# Patient Record
Sex: MALE | Race: WHITE | NOT HISPANIC OR LATINO | Employment: PART TIME | URBAN - METROPOLITAN AREA
[De-identification: names, ages, dates, MRNs, and addresses within clinical notes are randomized per-mention and may not be internally consistent; named-entity substitution may affect disease eponyms.]

---

## 2017-10-16 ENCOUNTER — HOSPITAL ENCOUNTER (EMERGENCY)
Facility: HOSPITAL | Age: 57
Discharge: HOME | End: 2017-10-16
Attending: FAMILY MEDICINE | Admitting: FAMILY MEDICINE

## 2017-10-16 ENCOUNTER — APPOINTMENT (OUTPATIENT)
Dept: GENERAL RADIOLOGY | Facility: HOSPITAL | Age: 57
End: 2017-10-16

## 2017-10-16 VITALS
HEIGHT: 72 IN | BODY MASS INDEX: 28.17 KG/M2 | OXYGEN SATURATION: 98 % | DIASTOLIC BLOOD PRESSURE: 66 MMHG | RESPIRATION RATE: 18 BRPM | TEMPERATURE: 96.9 F | WEIGHT: 208 LBS | SYSTOLIC BLOOD PRESSURE: 111 MMHG | HEART RATE: 60 BPM

## 2017-10-16 DIAGNOSIS — S80.11XA CONTUSION OF RIGHT LOWER LEG, INITIAL ENCOUNTER: Primary | ICD-10-CM

## 2017-10-16 PROCEDURE — 73590 X-RAY EXAM OF LOWER LEG: CPT

## 2017-10-16 PROCEDURE — 99283 EMERGENCY DEPT VISIT LOW MDM: CPT

## 2017-10-16 NOTE — ED PROVIDER NOTES
EMERGENCY DEPARTMENT ENCOUNTER    CHIEF COMPLAINT  Chief Complaint: R leg injury  History given by: Pt  History limited by: Nothing  Room Number: 36/36  PMD: No Known Provider      HPI:  Pt is a 56 y.o. male who presents complaining of R leg injury, onset 11 days ago. Pt states he attempted to jump into his truck trailer, but landed on his R shin. He also c/o R ankle soreness that is exacerbated with movement. He denies CP and SOA.    Duration:  11 days  Onset: sudden  Timing: constant  Location: anterior RLE  Radiation: none  Quality: pain  Intensity/Severity: moderate  Progression: unchanged  Associated Symptoms: R ankle soreness that is worsened with movement  Aggravating Factors: movement  Alleviating Factors: none  Previous Episodes: None stated  Treatment before arrival: Pt did not state any treatment PTA    PAST MEDICAL HISTORY  Active Ambulatory Problems     Diagnosis Date Noted   • No Active Ambulatory Problems     Resolved Ambulatory Problems     Diagnosis Date Noted   • No Resolved Ambulatory Problems     Past Medical History:   Diagnosis Date   • GERD (gastroesophageal reflux disease)        PAST SURGICAL HISTORY  Past Surgical History:   Procedure Laterality Date   • HERNIA REPAIR         FAMILY HISTORY  History reviewed. No pertinent family history.    SOCIAL HISTORY  Social History     Social History   • Marital status:      Spouse name: N/A   • Number of children: N/A   • Years of education: N/A     Occupational History   • Not on file.     Social History Main Topics   • Smoking status: Never Smoker   • Smokeless tobacco: Not on file   • Alcohol use Yes      Comment: occ   • Drug use: No   • Sexual activity: Not on file     Other Topics Concern   • Not on file     Social History Narrative   • No narrative on file       ALLERGIES  Penicillins    REVIEW OF SYSTEMS  Review of Systems   Constitutional: Negative for activity change, appetite change and fever.   Respiratory: Negative for cough and  shortness of breath.    Cardiovascular: Negative for chest pain and leg swelling.   Gastrointestinal: Negative for abdominal pain, diarrhea and vomiting.   Musculoskeletal: Positive for arthralgias (R ankle). Negative for neck pain.   Skin: Positive for wound (RLE). Negative for rash.   Neurological: Negative for weakness, numbness and headaches.   Psychiatric/Behavioral: Negative.    All other systems reviewed and are negative.      PHYSICAL EXAM  ED Triage Vitals   Temp Heart Rate Resp BP SpO2   10/16/17 1215 10/16/17 1215 10/16/17 1215 10/16/17 1243 10/16/17 1215   97.8 °F (36.6 °C) 90 16 142/92 99 %      Temp src Heart Rate Source Patient Position BP Location FiO2 (%)   10/16/17 1215 -- 10/16/17 1243 -- --   Tympanic  Sitting         Physical Exam   Constitutional: He is oriented to person, place, and time and well-developed, well-nourished, and in no distress. No distress.   HENT:   Head: Normocephalic and atraumatic.   Cardiovascular: Normal rate, regular rhythm and normal heart sounds.    Pulses:       Dorsalis pedis pulses are 2+ on the right side, and 2+ on the left side.   Pulmonary/Chest: Effort normal and breath sounds normal. No respiratory distress.   Abdominal: Soft. There is no tenderness. There is no rebound and no guarding.   Musculoskeletal: Normal range of motion. He exhibits edema (RLE) and tenderness (RLE).        Right knee: He exhibits normal range of motion.        Right ankle: He exhibits normal range of motion.        Right foot: There is tenderness (mild) and swelling (mild).   Swelling and tenderness, with abrasion to anterior mid right lower leg.  No calf tenderness, negative Homans sign   Neurological: He is alert and oriented to person, place, and time. He has normal sensation and normal strength.   Skin: Skin is warm and dry. Abrasion (RLE) noted.   Mild ecchymosis of R foot   Psychiatric: Mood and affect normal.   Nursing note and vitals reviewed.      LAB RESULTS  Lab Results (last  24 hours)     ** No results found for the last 24 hours. **          I ordered the above labs and reviewed the results    RADIOLOGY  XR Tibia Fibula 2 View Right     No signs of acute fracture or osseous abnormality          I ordered the above noted radiological studies. Interpreted by radiologist. Reviewed by me in PACS.       PROCEDURES  Procedures      PROGRESS AND CONSULTS  ED Course     1430 - XR tibia/fibula R ordered for further evaluation.     1551 - Informed pt of his negative XR, and that his injury will take some time to heal due to the location. Instructed pt to apply to warm compress to the area as needed for pain and to follow up with Dr. Cotton if his sx do not improved. Pt understands and agrees with plan. All questions answered.      MEDICAL DECISION MAKING  Results were reviewed/discussed with the patient and they were also made aware of online access. Pt also made aware that some labs, such as cultures, will not be resulted during ER visit and follow up with PMD is necessary.     MDM  Number of Diagnoses or Management Options     Amount and/or Complexity of Data Reviewed  Tests in the radiology section of CPT®: ordered and reviewed (XR Tibia/Fibula R - No signs of acute fracture or osseous abnormality)           DIAGNOSIS  Final diagnoses:   Contusion of right lower leg, initial encounter       DISPOSITION  DISCHARGE    Patient discharged in stable condition.    Reviewed implications of results, diagnosis, meds, responsibility to follow up, warning signs and symptoms of possible worsening, potential complications and reasons to return to ER.    Patient/Family voiced understanding of above instructions.    Discussed plan for discharge, as there is no emergent indication for admission.  Pt/family is agreeable and understands need for follow up and repeat testing.  Pt is aware that discharge does not mean that nothing is wrong but it indicates no emergency is present that requires admission and they  must continue care with follow-up as given below or physician of their choice.     FOLLOW-UP  Elier Cotton MD  [address] [address]  [phone]    In 2 weeks  if no improvement         Medication List      Notice     No changes were made to your prescriptions during this visit.            Latest Documented Vital Signs:  As of 4:29 PM  BP- 142/92 HR- 90 Temp- 97.8 °F (36.6 °C) (Tympanic) O2 sat- 99%    --  Documentation assistance provided by bruno Drummond for Lukasz Hanks PA-C.  Information recorded by the scribe was done at my direction and has been verified and validated by me.     Sabino Drummond  10/16/17 1638       RABIA Jimenez  10/16/17 [id number]

## 2017-10-16 NOTE — ED TRIAGE NOTES
Pt c/o right lower leg injury 10 days ago, states he jumped into a truck and hit his right shin on a piece of metal. C/o pain, swelling and bruising to right shin, ankle and foot

## 2017-10-16 NOTE — ED PROVIDER NOTES
Pt presents complaining of right lower leg pain and swelling after striking it on a trailer 11 days ago. Upon exam, there is dried eschar over mid tibia without signs of cellulitis. HOMANS is negative. No calf tenderness. Achilles tendon is intact. XR tibia fibula is unremarkable. Pt will be discharged.     I supervised care provided by the midlevel provider.    We have discussed this patient's history, physical exam, and treatment plan.   I have reviewed the note and personally saw and examined the patient and agree with the plan of care.    Documentation assistance provided by bruno Dawson for Luc Montiel.  Information recorded by the scribe was done at my direction and has been verified and validated by me.       Steph Dawson  10/16/17 1637       Luc Montiel MD  10/16/17 1651